# Patient Record
Sex: FEMALE | Race: BLACK OR AFRICAN AMERICAN | NOT HISPANIC OR LATINO | URBAN - NONMETROPOLITAN AREA
[De-identification: names, ages, dates, MRNs, and addresses within clinical notes are randomized per-mention and may not be internally consistent; named-entity substitution may affect disease eponyms.]

---

## 2021-05-14 ENCOUNTER — IMPORTED ENCOUNTER (OUTPATIENT)
Dept: URBAN - NONMETROPOLITAN AREA CLINIC 1 | Facility: CLINIC | Age: 51
End: 2021-05-14

## 2021-05-14 PROBLEM — H40.013: Noted: 2021-05-14

## 2021-05-14 PROBLEM — H52.4: Noted: 2021-05-14

## 2021-05-14 PROCEDURE — S0620 ROUTINE OPHTHALMOLOGICAL EXA: HCPCS

## 2021-05-14 NOTE — PATIENT DISCUSSION
Presbyopia OUDiscussed refractive status in detail with patient. New glasses Rx given today. Continue to monitor. Glaucoma Suspect OUDiscussed diagnosis in detail with patient. No family history. IOP at 14 OD and 16 OS. Cup to disc noted at 0.8 OU. Continue to monitor. RTC in 6 months with VF 24-2 OCT and Pach; 's Notes: MR  5/14/21DFE 5/14/21OCTVF OPTOSGONIOPACH

## 2022-04-09 ASSESSMENT — VISUAL ACUITY
OD_CC: 20/25+
OS_CC: 20/25+

## 2022-04-09 ASSESSMENT — TONOMETRY
OD_IOP_MMHG: 14
OS_IOP_MMHG: 16

## 2023-04-20 NOTE — PATIENT DISCUSSION
Continue present management. Repeat labs at this time  Trying to lessen weight with diet and exercise

## 2023-05-26 ENCOUNTER — COMPREHENSIVE EXAM (OUTPATIENT)
Dept: URBAN - NONMETROPOLITAN AREA CLINIC 1 | Facility: CLINIC | Age: 53
End: 2023-05-26

## 2023-05-26 DIAGNOSIS — H52.4: ICD-10-CM

## 2023-05-26 PROCEDURE — 92015 DETERMINE REFRACTIVE STATE: CPT

## 2023-05-26 PROCEDURE — 92014 COMPRE OPH EXAM EST PT 1/>: CPT

## 2023-05-26 ASSESSMENT — TONOMETRY
OD_IOP_MMHG: 14
OS_IOP_MMHG: 15

## 2023-05-26 ASSESSMENT — VISUAL ACUITY
OD_CC: 20/29
OS_CC: 20/29